# Patient Record
Sex: MALE | Race: WHITE | NOT HISPANIC OR LATINO | Employment: FULL TIME | URBAN - NONMETROPOLITAN AREA
[De-identification: names, ages, dates, MRNs, and addresses within clinical notes are randomized per-mention and may not be internally consistent; named-entity substitution may affect disease eponyms.]

---

## 2023-04-04 ENCOUNTER — TELEPHONE (OUTPATIENT)
Dept: PRIMARY CARE | Facility: CLINIC | Age: 58
End: 2023-04-04
Payer: COMMERCIAL

## 2023-04-04 DIAGNOSIS — F90.9 ATTENTION DEFICIT HYPERACTIVITY DISORDER (ADHD), UNSPECIFIED ADHD TYPE: ICD-10-CM

## 2023-04-04 RX ORDER — DEXTROAMPHETAMINE SACCHARATE, AMPHETAMINE ASPARTATE MONOHYDRATE, DEXTROAMPHETAMINE SULFATE AND AMPHETAMINE SULFATE 2.5; 2.5; 2.5; 2.5 MG/1; MG/1; MG/1; MG/1
1 CAPSULE, EXTENDED RELEASE ORAL DAILY
COMMUNITY
Start: 2023-03-02 | End: 2023-04-04 | Stop reason: SDUPTHER

## 2023-04-04 RX ORDER — DEXTROAMPHETAMINE SACCHARATE, AMPHETAMINE ASPARTATE MONOHYDRATE, DEXTROAMPHETAMINE SULFATE AND AMPHETAMINE SULFATE 2.5; 2.5; 2.5; 2.5 MG/1; MG/1; MG/1; MG/1
10 CAPSULE, EXTENDED RELEASE ORAL DAILY
Qty: 30 CAPSULE | Refills: 0 | Status: SHIPPED | OUTPATIENT
Start: 2023-04-04 | End: 2023-05-01 | Stop reason: SDUPTHER

## 2023-04-28 PROBLEM — G47.00 INSOMNIA: Status: ACTIVE | Noted: 2023-04-28

## 2023-04-28 PROBLEM — F41.9 ANXIETY: Status: ACTIVE | Noted: 2023-04-28

## 2023-04-28 PROBLEM — R53.83 FATIGUE: Status: ACTIVE | Noted: 2023-04-28

## 2023-04-28 PROBLEM — E29.1 HYPOGONADISM MALE: Status: ACTIVE | Noted: 2023-04-28

## 2023-04-28 PROBLEM — M10.072 IDIOPATHIC GOUT INVOLVING TOE OF LEFT FOOT: Status: ACTIVE | Noted: 2023-04-28

## 2023-04-28 PROBLEM — M25.569 KNEE PAIN: Status: RESOLVED | Noted: 2023-04-28 | Resolved: 2023-04-28

## 2023-04-28 RX ORDER — ALLOPURINOL 100 MG/1
100 TABLET ORAL 2 TIMES DAILY
COMMUNITY
End: 2023-07-03

## 2023-04-28 RX ORDER — ZOLPIDEM TARTRATE 10 MG/1
10 TABLET ORAL NIGHTLY PRN
COMMUNITY
End: 2023-06-01 | Stop reason: SDUPTHER

## 2023-04-28 RX ORDER — ESCITALOPRAM OXALATE 20 MG/1
20 TABLET ORAL DAILY
COMMUNITY
End: 2023-07-03 | Stop reason: SDUPTHER

## 2023-05-01 ENCOUNTER — TELEMEDICINE (OUTPATIENT)
Dept: PRIMARY CARE | Facility: CLINIC | Age: 58
End: 2023-05-01
Payer: COMMERCIAL

## 2023-05-01 DIAGNOSIS — F90.9 ATTENTION DEFICIT HYPERACTIVITY DISORDER (ADHD), UNSPECIFIED ADHD TYPE: ICD-10-CM

## 2023-05-01 DIAGNOSIS — F51.01 PRIMARY INSOMNIA: Primary | ICD-10-CM

## 2023-05-01 DIAGNOSIS — F98.8 ATTENTION DEFICIT DISORDER (ADD) WITHOUT HYPERACTIVITY: ICD-10-CM

## 2023-05-01 PROCEDURE — 99212 OFFICE O/P EST SF 10 MIN: CPT | Performed by: FAMILY MEDICINE

## 2023-05-01 RX ORDER — DEXTROAMPHETAMINE SACCHARATE, AMPHETAMINE ASPARTATE MONOHYDRATE, DEXTROAMPHETAMINE SULFATE AND AMPHETAMINE SULFATE 2.5; 2.5; 2.5; 2.5 MG/1; MG/1; MG/1; MG/1
10 CAPSULE, EXTENDED RELEASE ORAL DAILY
Qty: 30 CAPSULE | Refills: 0 | Status: SHIPPED | OUTPATIENT
Start: 2023-05-01 | End: 2023-06-02 | Stop reason: SDUPTHER

## 2023-05-01 NOTE — PROGRESS NOTES
Subjective   Patient ID: Raymond Ruiz is a 57 y.o. male who presents for 3 mo fu virtual.    HPI since the last office visit there have been no interval operations, hospitalizations, important illnesses or injuries.  ADD Adderall working quite well, tried 15 and side effects and back to 10.  70% improvement I nttarget symptoms without  tic or twitching.  No headache stomachache loss of appetite or exacerbation of insomnia  Primary insomnia uses a half of zolpidem nightly with no problems with sleep latency early morning awakening.  Sleep is restorative and no hangover wishes to continue.  Spends 1 out of 4 weeks in [place].    Review of Systems general-no fatigue weight to within 10 pounds  ENT no problems with vision swallowing  Cardiac no chest pains palpitations change in exercise tolerance or capacity  Pulmonary no cough shortness of breath  GI no heartburn or abdominal pain  Musculoskeletal no joint pains    Objective   There were no vitals taken for this visit.    Physical Exam  Video visit  Assessment/Plan   Problem List Items Addressed This Visit          Nervous    Insomnia - Primary    Relevant Orders    Follow Up In Primary Care       Other    Attention deficit disorder (ADD) without hyperactivity    Relevant Orders    Follow Up In Primary Care     We will see face-to-face in 6 months consider lab needs at that time.  [id number] charge for video visit

## 2023-06-01 DIAGNOSIS — F51.01 PRIMARY INSOMNIA: ICD-10-CM

## 2023-06-01 RX ORDER — ZOLPIDEM TARTRATE 10 MG/1
10 TABLET ORAL NIGHTLY PRN
Qty: 30 TABLET | Refills: 0 | Status: SHIPPED | OUTPATIENT
Start: 2023-06-01 | End: 2023-07-31 | Stop reason: SDUPTHER

## 2023-06-02 DIAGNOSIS — F90.9 ATTENTION DEFICIT HYPERACTIVITY DISORDER (ADHD), UNSPECIFIED ADHD TYPE: ICD-10-CM

## 2023-06-05 RX ORDER — DEXTROAMPHETAMINE SACCHARATE, AMPHETAMINE ASPARTATE MONOHYDRATE, DEXTROAMPHETAMINE SULFATE AND AMPHETAMINE SULFATE 2.5; 2.5; 2.5; 2.5 MG/1; MG/1; MG/1; MG/1
10 CAPSULE, EXTENDED RELEASE ORAL DAILY
Qty: 30 CAPSULE | Refills: 0 | Status: SHIPPED | OUTPATIENT
Start: 2023-06-05 | End: 2023-07-03 | Stop reason: SDUPTHER

## 2023-07-03 DIAGNOSIS — F90.9 ATTENTION DEFICIT HYPERACTIVITY DISORDER (ADHD), UNSPECIFIED ADHD TYPE: ICD-10-CM

## 2023-07-03 DIAGNOSIS — M25.569 PAIN IN UNSPECIFIED KNEE: ICD-10-CM

## 2023-07-03 DIAGNOSIS — F41.9 ANXIETY: ICD-10-CM

## 2023-07-03 RX ORDER — ESCITALOPRAM OXALATE 20 MG/1
20 TABLET ORAL DAILY
Qty: 90 TABLET | Refills: 3 | Status: SHIPPED | OUTPATIENT
Start: 2023-07-03 | End: 2023-11-21 | Stop reason: SDUPTHER

## 2023-07-03 RX ORDER — ALLOPURINOL 100 MG/1
TABLET ORAL
Qty: 180 TABLET | Refills: 3 | Status: SHIPPED | OUTPATIENT
Start: 2023-07-03

## 2023-07-03 RX ORDER — DEXTROAMPHETAMINE SACCHARATE, AMPHETAMINE ASPARTATE MONOHYDRATE, DEXTROAMPHETAMINE SULFATE AND AMPHETAMINE SULFATE 2.5; 2.5; 2.5; 2.5 MG/1; MG/1; MG/1; MG/1
10 CAPSULE, EXTENDED RELEASE ORAL DAILY
Qty: 30 CAPSULE | Refills: 0 | Status: SHIPPED | OUTPATIENT
Start: 2023-07-03 | End: 2023-08-02 | Stop reason: SDUPTHER

## 2023-07-31 DIAGNOSIS — F51.01 PRIMARY INSOMNIA: ICD-10-CM

## 2023-07-31 RX ORDER — ZOLPIDEM TARTRATE 10 MG/1
10 TABLET ORAL NIGHTLY PRN
Qty: 30 TABLET | Refills: 0 | Status: SHIPPED | OUTPATIENT
Start: 2023-07-31 | End: 2023-09-25 | Stop reason: SDUPTHER

## 2023-08-02 DIAGNOSIS — F90.9 ATTENTION DEFICIT HYPERACTIVITY DISORDER (ADHD), UNSPECIFIED ADHD TYPE: ICD-10-CM

## 2023-08-02 RX ORDER — DEXTROAMPHETAMINE SACCHARATE, AMPHETAMINE ASPARTATE MONOHYDRATE, DEXTROAMPHETAMINE SULFATE AND AMPHETAMINE SULFATE 2.5; 2.5; 2.5; 2.5 MG/1; MG/1; MG/1; MG/1
10 CAPSULE, EXTENDED RELEASE ORAL DAILY
Qty: 30 CAPSULE | Refills: 0 | Status: SHIPPED | OUTPATIENT
Start: 2023-08-02 | End: 2023-08-31 | Stop reason: SDUPTHER

## 2023-08-31 DIAGNOSIS — F90.9 ATTENTION DEFICIT HYPERACTIVITY DISORDER (ADHD), UNSPECIFIED ADHD TYPE: ICD-10-CM

## 2023-08-31 RX ORDER — DEXTROAMPHETAMINE SACCHARATE, AMPHETAMINE ASPARTATE MONOHYDRATE, DEXTROAMPHETAMINE SULFATE AND AMPHETAMINE SULFATE 2.5; 2.5; 2.5; 2.5 MG/1; MG/1; MG/1; MG/1
10 CAPSULE, EXTENDED RELEASE ORAL DAILY
Qty: 30 CAPSULE | Refills: 0 | Status: SHIPPED | OUTPATIENT
Start: 2023-08-31 | End: 2023-09-29 | Stop reason: SDUPTHER

## 2023-09-25 DIAGNOSIS — F51.01 PRIMARY INSOMNIA: ICD-10-CM

## 2023-09-25 RX ORDER — ZOLPIDEM TARTRATE 10 MG/1
10 TABLET ORAL NIGHTLY PRN
Qty: 30 TABLET | Refills: 0 | Status: SHIPPED | OUTPATIENT
Start: 2023-09-25 | End: 2023-11-21 | Stop reason: SDUPTHER

## 2023-09-29 DIAGNOSIS — F90.9 ATTENTION DEFICIT HYPERACTIVITY DISORDER (ADHD), UNSPECIFIED ADHD TYPE: ICD-10-CM

## 2023-09-29 RX ORDER — DEXTROAMPHETAMINE SACCHARATE, AMPHETAMINE ASPARTATE MONOHYDRATE, DEXTROAMPHETAMINE SULFATE AND AMPHETAMINE SULFATE 2.5; 2.5; 2.5; 2.5 MG/1; MG/1; MG/1; MG/1
10 CAPSULE, EXTENDED RELEASE ORAL DAILY
Qty: 30 CAPSULE | Refills: 0 | Status: SHIPPED | OUTPATIENT
Start: 2023-09-29 | End: 2023-11-21 | Stop reason: WASHOUT

## 2023-11-09 ENCOUNTER — APPOINTMENT (OUTPATIENT)
Dept: PRIMARY CARE | Facility: CLINIC | Age: 58
End: 2023-11-09
Payer: COMMERCIAL

## 2023-11-14 ENCOUNTER — APPOINTMENT (OUTPATIENT)
Dept: PRIMARY CARE | Facility: CLINIC | Age: 58
End: 2023-11-14
Payer: COMMERCIAL

## 2023-11-21 ENCOUNTER — TELEPHONE (OUTPATIENT)
Dept: PRIMARY CARE | Facility: CLINIC | Age: 58
End: 2023-11-21

## 2023-11-21 ENCOUNTER — LAB (OUTPATIENT)
Dept: LAB | Facility: LAB | Age: 58
End: 2023-11-21
Payer: COMMERCIAL

## 2023-11-21 ENCOUNTER — OFFICE VISIT (OUTPATIENT)
Dept: PRIMARY CARE | Facility: CLINIC | Age: 58
End: 2023-11-21
Payer: COMMERCIAL

## 2023-11-21 VITALS
DIASTOLIC BLOOD PRESSURE: 72 MMHG | SYSTOLIC BLOOD PRESSURE: 124 MMHG | WEIGHT: 217 LBS | BODY MASS INDEX: 31.07 KG/M2 | HEART RATE: 92 BPM | HEIGHT: 70 IN | OXYGEN SATURATION: 94 %

## 2023-11-21 DIAGNOSIS — F51.01 PRIMARY INSOMNIA: ICD-10-CM

## 2023-11-21 DIAGNOSIS — F41.9 ANXIETY: ICD-10-CM

## 2023-11-21 DIAGNOSIS — Z12.5 SCREENING FOR PROSTATE CANCER: ICD-10-CM

## 2023-11-21 DIAGNOSIS — M1A.0720 CHRONIC IDIOPATHIC GOUT INVOLVING TOE OF LEFT FOOT WITHOUT TOPHUS: Primary | ICD-10-CM

## 2023-11-21 DIAGNOSIS — F98.8 ATTENTION DEFICIT DISORDER (ADD) WITHOUT HYPERACTIVITY: ICD-10-CM

## 2023-11-21 DIAGNOSIS — M1A.0720 CHRONIC IDIOPATHIC GOUT INVOLVING TOE OF LEFT FOOT WITHOUT TOPHUS: ICD-10-CM

## 2023-11-21 LAB
ALBUMIN SERPL BCP-MCNC: 4.4 G/DL (ref 3.4–5)
ALP SERPL-CCNC: 70 U/L (ref 33–120)
ALT SERPL W P-5'-P-CCNC: 39 U/L (ref 10–52)
ANION GAP SERPL CALC-SCNC: 10 MMOL/L (ref 10–20)
AST SERPL W P-5'-P-CCNC: 22 U/L (ref 9–39)
BILIRUB SERPL-MCNC: 0.8 MG/DL (ref 0–1.2)
BUN SERPL-MCNC: 16 MG/DL (ref 6–23)
CALCIUM SERPL-MCNC: 9.3 MG/DL (ref 8.6–10.3)
CHLORIDE SERPL-SCNC: 105 MMOL/L (ref 98–107)
CHOLEST SERPL-MCNC: 213 MG/DL (ref 0–199)
CHOLESTEROL/HDL RATIO: 3.8
CO2 SERPL-SCNC: 29 MMOL/L (ref 21–32)
CREAT SERPL-MCNC: 0.94 MG/DL (ref 0.5–1.3)
ERYTHROCYTE [DISTWIDTH] IN BLOOD BY AUTOMATED COUNT: 13.2 % (ref 11.5–14.5)
GFR SERPL CREATININE-BSD FRML MDRD: >90 ML/MIN/1.73M*2
GLUCOSE SERPL-MCNC: 108 MG/DL (ref 74–99)
HCT VFR BLD AUTO: 47 % (ref 41–52)
HDLC SERPL-MCNC: 56 MG/DL
HGB BLD-MCNC: 15.6 G/DL (ref 13.5–17.5)
LDLC SERPL CALC-MCNC: 139 MG/DL
MCH RBC QN AUTO: 31.2 PG (ref 26–34)
MCHC RBC AUTO-ENTMCNC: 33.2 G/DL (ref 32–36)
MCV RBC AUTO: 94 FL (ref 80–100)
NON HDL CHOLESTEROL: 157 MG/DL (ref 0–149)
NRBC BLD-RTO: 0 /100 WBCS (ref 0–0)
PLATELET # BLD AUTO: 165 X10*3/UL (ref 150–450)
POTASSIUM SERPL-SCNC: 4.2 MMOL/L (ref 3.5–5.3)
PROT SERPL-MCNC: 6.6 G/DL (ref 6.4–8.2)
PSA SERPL-MCNC: 0.62 NG/ML
RBC # BLD AUTO: 5 X10*6/UL (ref 4.5–5.9)
SODIUM SERPL-SCNC: 140 MMOL/L (ref 136–145)
TRIGL SERPL-MCNC: 89 MG/DL (ref 0–149)
VLDL: 18 MG/DL (ref 0–40)
WBC # BLD AUTO: 8.2 X10*3/UL (ref 4.4–11.3)

## 2023-11-21 PROCEDURE — 36415 COLL VENOUS BLD VENIPUNCTURE: CPT

## 2023-11-21 PROCEDURE — 99214 OFFICE O/P EST MOD 30 MIN: CPT | Performed by: FAMILY MEDICINE

## 2023-11-21 PROCEDURE — 1036F TOBACCO NON-USER: CPT | Performed by: FAMILY MEDICINE

## 2023-11-21 PROCEDURE — 85027 COMPLETE CBC AUTOMATED: CPT

## 2023-11-21 PROCEDURE — 80061 LIPID PANEL: CPT

## 2023-11-21 PROCEDURE — 84153 ASSAY OF PSA TOTAL: CPT

## 2023-11-21 PROCEDURE — 80053 COMPREHEN METABOLIC PANEL: CPT

## 2023-11-21 RX ORDER — ESCITALOPRAM OXALATE 20 MG/1
20 TABLET ORAL DAILY
Qty: 90 TABLET | Refills: 3 | Status: SHIPPED | OUTPATIENT
Start: 2023-11-21 | End: 2024-11-20

## 2023-11-21 RX ORDER — ZOLPIDEM TARTRATE 10 MG/1
10 TABLET ORAL NIGHTLY PRN
Qty: 30 TABLET | Refills: 0 | Status: SHIPPED | OUTPATIENT
Start: 2023-11-21 | End: 2024-01-17 | Stop reason: SDUPTHER

## 2023-11-21 RX ORDER — ATOMOXETINE 40 MG/1
40 CAPSULE ORAL DAILY
Qty: 30 CAPSULE | Refills: 3 | Status: SHIPPED | OUTPATIENT
Start: 2023-11-21 | End: 2024-01-22 | Stop reason: WASHOUT

## 2023-11-21 RX ORDER — ATOMOXETINE 25 MG/1
25 CAPSULE ORAL DAILY
Qty: 14 CAPSULE | Refills: 0 | Status: SHIPPED | OUTPATIENT
Start: 2023-11-21 | End: 2024-01-22 | Stop reason: WASHOUT

## 2023-11-21 NOTE — TELEPHONE ENCOUNTER
----- Message from Ruslan Cuello MD sent at 11/21/2023  1:03 PM EST -----  Please let patient know all the labs done today is good.

## 2023-11-21 NOTE — PROGRESS NOTES
"Subjective   Patient ID: Raymond Ruiz is a 58 y.o. male who presents for Follow-up (6 mo).    HPI   Stopped adderalll 2 t constipation.  Noted target symptoms of focus and impulsivity returned.  Family member on Strattera.  He would use prescription provided    Insomnia 30 minutes or less sleep latency no early morning awakening no hangover sleep is restorative historically was taking half tab when he was out of town last month he was out of town for 1 month and generally takes half a tab daily. I have personally reviewed the OARRS report for the above patient.  This report is scanned into the electronic medical record.  I have considered the risks of abuse, dependence, addiction, and diversion.  I believe that it is clinically appropriate for the above patient to be prescribed this medication.  Anxiety is managed on Lexapro, felt less anxiety when on Adderall.  Gout- no interval exacerbations, continues on prophylactic therapy without side effects.      Review of Systems  General-no fatigue weight to within 10 pounds  ENT no problems with vision swallowing  Cardiac no chest pains palpitations change in exercise tolerance or capacity  Pulmonary no cough shortness of breath  GI no heartburn or abdominal pain  Musculoskeletal no joint pains  Objective   /72   Pulse 92   Ht 1.778 m (5' 10\")   Wt 98.4 kg (217 lb)   SpO2 94%   BMI 31.14 kg/m²     Physical Exam  General:  Alert, No acute distress. Appears stated age  Eye:  Pupils are equal, round and reactive to light, Extraocular movements are intact, Normal conjunctiva.    Neck:  Supple, Non-tender, No carotid bruit, No jugular venous distention, No lymphadenopathy, No thyromegaly.    Respiratory:  Lungs are clear to auscultation, Respirations are non-labored, Breath sounds are equal.    Cardiovascular:  Normal rate, Regular rhythm, No murmur.    Gastrointestinal:  Soft, Non-tender, No organomegaly. No solid or pulsatile mass  Integumentary:  Warm, Dry. " No concerning lesions on exposed areas  Neurologic:  Alert, Oriented.  Gross and fine motor intact, CN 2-12 intact  Psychiatric:  Cooperative, Appropriate mood & affect.  Assessment/Plan   Problem List Items Addressed This Visit             ICD-10-CM    Anxiety F41.9    Relevant Medications    escitalopram (Lexapro) 20 mg tablet    zolpidem (Ambien) 10 mg tablet    Other Relevant Orders    CBC (Completed)    Comprehensive Metabolic Panel (Completed)    Lipid Panel (Completed)    Follow Up In Primary Care - Established    Idiopathic gout involving toe of left foot - Primary M10.072    Relevant Orders    CBC (Completed)    Comprehensive Metabolic Panel (Completed)    Follow Up In Primary Care - Established    Insomnia G47.00    Relevant Medications    zolpidem (Ambien) 10 mg tablet    Other Relevant Orders    CBC (Completed)    Comprehensive Metabolic Panel (Completed)    Lipid Panel (Completed)    Follow Up In Primary Care - Established    Attention deficit disorder (ADD) without hyperactivity F98.8    Relevant Medications    atomoxetine (Strattera) 25 mg capsule    atomoxetine (Strattera) 40 mg capsule    Other Relevant Orders    Follow Up In Primary Care - Established     Other Visit Diagnoses         Codes    Screening for prostate cancer     Z12.5    Relevant Orders    Prostate Specific Antigen, Screen (Completed)    Follow Up In Primary Care - Established

## 2023-12-04 ENCOUNTER — TELEPHONE (OUTPATIENT)
Dept: PRIMARY CARE | Facility: CLINIC | Age: 58
End: 2023-12-04
Payer: COMMERCIAL

## 2023-12-05 DIAGNOSIS — K64.9 HEMORRHOIDS, UNSPECIFIED HEMORRHOID TYPE: ICD-10-CM

## 2023-12-14 ENCOUNTER — TELEPHONE (OUTPATIENT)
Dept: PRIMARY CARE | Facility: CLINIC | Age: 58
End: 2023-12-14
Payer: COMMERCIAL

## 2023-12-14 NOTE — TELEPHONE ENCOUNTER
PATIENT CANNOT SEE DR RUSSO UNTIL AFTER JANUARY. IS IN EXCRUCIATING PAIN. HAS USED CREAM, SUPPOSITORIES WITHOUT HELP. PLEASE ADVISE.

## 2023-12-15 ENCOUNTER — TELEPHONE (OUTPATIENT)
Dept: PRIMARY CARE | Facility: CLINIC | Age: 58
End: 2023-12-15
Payer: COMMERCIAL

## 2023-12-15 DIAGNOSIS — K64.9 HEMORRHOIDS, UNSPECIFIED HEMORRHOID TYPE: ICD-10-CM

## 2023-12-15 NOTE — TELEPHONE ENCOUNTER
[facility] first available, or call [facility]     You routed conversation to Ruslan Cuello MD22 hours ago (3:50 PM)     You22 hours ago (3:50 PM)       PATIENT CANNOT SEE DR RUSSO UNTIL AFTER JANUARY. IS IN EXCRUCIATING PAIN. HAS USED CREAM, SUPPOSITORIES WITHOUT HELP. PLEASE ADVISE.      Unsigned     PATIENT NOTIFIED. AGREEABLE TO SEE [facility] AT [facility]. ORDER, RECORDS, DEMOGRAPHICS, AND INSURANCE FAXED

## 2024-01-04 ENCOUNTER — APPOINTMENT (OUTPATIENT)
Dept: SURGERY | Facility: CLINIC | Age: 59
End: 2024-01-04
Payer: COMMERCIAL

## 2024-01-17 DIAGNOSIS — F41.9 ANXIETY: ICD-10-CM

## 2024-01-17 DIAGNOSIS — F51.01 PRIMARY INSOMNIA: ICD-10-CM

## 2024-01-17 RX ORDER — ZOLPIDEM TARTRATE 10 MG/1
10 TABLET ORAL NIGHTLY PRN
Qty: 30 TABLET | Refills: 0 | Status: SHIPPED | OUTPATIENT
Start: 2024-01-17 | End: 2024-03-11 | Stop reason: SDUPTHER

## 2024-01-22 ENCOUNTER — TELEPHONE (OUTPATIENT)
Dept: PRIMARY CARE | Facility: CLINIC | Age: 59
End: 2024-01-22
Payer: COMMERCIAL

## 2024-01-22 DIAGNOSIS — F98.8 ATTENTION DEFICIT DISORDER (ADD) WITHOUT HYPERACTIVITY: Primary | ICD-10-CM

## 2024-01-22 RX ORDER — DEXTROAMPHETAMINE SACCHARATE, AMPHETAMINE ASPARTATE MONOHYDRATE, DEXTROAMPHETAMINE SULFATE AND AMPHETAMINE SULFATE 3.75; 3.75; 3.75; 3.75 MG/1; MG/1; MG/1; MG/1
15 CAPSULE, EXTENDED RELEASE ORAL DAILY
COMMUNITY
Start: 2023-02-04 | End: 2024-01-22 | Stop reason: SDUPTHER

## 2024-01-22 RX ORDER — DEXTROAMPHETAMINE SACCHARATE, AMPHETAMINE ASPARTATE MONOHYDRATE, DEXTROAMPHETAMINE SULFATE AND AMPHETAMINE SULFATE 2.5; 2.5; 2.5; 2.5 MG/1; MG/1; MG/1; MG/1
10 CAPSULE, EXTENDED RELEASE ORAL DAILY
Qty: 30 CAPSULE | Refills: 0 | Status: SHIPPED | OUTPATIENT
Start: 2024-01-22 | End: 2024-05-22 | Stop reason: SDUPTHER

## 2024-01-23 ENCOUNTER — CLINICAL SUPPORT (OUTPATIENT)
Dept: PRIMARY CARE | Facility: CLINIC | Age: 59
End: 2024-01-23
Payer: COMMERCIAL

## 2024-01-23 DIAGNOSIS — F98.8 ATTENTION DEFICIT DISORDER (ADD) WITHOUT HYPERACTIVITY: ICD-10-CM

## 2024-01-23 LAB
AMPHETAMINES UR QL SCN: NORMAL
BARBITURATES UR QL SCN: NORMAL
BENZODIAZ UR QL SCN: NORMAL
BZE UR QL SCN: NORMAL
CANNABINOIDS UR QL SCN: NORMAL
FENTANYL+NORFENTANYL UR QL SCN: NORMAL
OPIATES UR QL SCN: NORMAL
OXYCODONE+OXYMORPHONE UR QL SCN: NORMAL
PCP UR QL SCN: NORMAL

## 2024-01-23 PROCEDURE — 80307 DRUG TEST PRSMV CHEM ANLYZR: CPT

## 2024-03-11 DIAGNOSIS — F51.01 PRIMARY INSOMNIA: ICD-10-CM

## 2024-03-11 DIAGNOSIS — F41.9 ANXIETY: ICD-10-CM

## 2024-03-11 RX ORDER — ZOLPIDEM TARTRATE 10 MG/1
10 TABLET ORAL NIGHTLY PRN
Qty: 30 TABLET | Refills: 0 | Status: SHIPPED | OUTPATIENT
Start: 2024-03-11

## 2024-05-22 ENCOUNTER — OFFICE VISIT (OUTPATIENT)
Dept: PRIMARY CARE | Facility: CLINIC | Age: 59
End: 2024-05-22
Payer: COMMERCIAL

## 2024-05-22 VITALS
WEIGHT: 220.2 LBS | BODY MASS INDEX: 31.52 KG/M2 | HEART RATE: 87 BPM | DIASTOLIC BLOOD PRESSURE: 80 MMHG | SYSTOLIC BLOOD PRESSURE: 124 MMHG | OXYGEN SATURATION: 96 % | HEIGHT: 70 IN

## 2024-05-22 DIAGNOSIS — F98.8 ATTENTION DEFICIT DISORDER (ADD) WITHOUT HYPERACTIVITY: ICD-10-CM

## 2024-05-22 DIAGNOSIS — F51.01 PRIMARY INSOMNIA: Primary | ICD-10-CM

## 2024-05-22 DIAGNOSIS — Z12.5 SCREENING FOR PROSTATE CANCER: ICD-10-CM

## 2024-05-22 DIAGNOSIS — E78.2 MIXED HYPERLIPIDEMIA: ICD-10-CM

## 2024-05-22 DIAGNOSIS — M1A.0720 CHRONIC IDIOPATHIC GOUT INVOLVING TOE OF LEFT FOOT WITHOUT TOPHUS: ICD-10-CM

## 2024-05-22 DIAGNOSIS — F41.9 ANXIETY: ICD-10-CM

## 2024-05-22 PROCEDURE — 1036F TOBACCO NON-USER: CPT | Performed by: FAMILY MEDICINE

## 2024-05-22 PROCEDURE — 99214 OFFICE O/P EST MOD 30 MIN: CPT | Performed by: FAMILY MEDICINE

## 2024-05-22 RX ORDER — DEXTROAMPHETAMINE SACCHARATE, AMPHETAMINE ASPARTATE MONOHYDRATE, DEXTROAMPHETAMINE SULFATE AND AMPHETAMINE SULFATE 1.25; 1.25; 1.25; 1.25 MG/1; MG/1; MG/1; MG/1
5 CAPSULE, EXTENDED RELEASE ORAL DAILY
Qty: 30 CAPSULE | Refills: 0 | Status: SHIPPED | OUTPATIENT
Start: 2024-05-22 | End: 2024-06-21

## 2024-05-22 NOTE — PROGRESS NOTES
"Subjective   Patient ID: Raymond Ruiz is a 58 y.o. male who presents for Follow-up (6 mo).    HPI   Had anal fissure and has had botox.  Had constipation(first time), docusate vs titrated miralax  Feels constipation from adderall and will titrate down if able trial of 5 mg  I have personally reviewed the OARRS report for the above patient.  This report is scanned into the electronic medical record.  I have considered the risks of abuse, dependence, addiction, and diversion.  I believe that it is clinically appropriate for the above patient to be prescribed this medication.    Insomnia less ambien use woth good sleep pattern, travelling less  Lexapro reduces anxiety, tried titration, back to whole  Gout- no interval exacerbations, continues on prophylactic therapy without side effects.  On allop 100  Review of Systems  General-no fatigue weight to within 10 pounds  ENT no problems with vision swallowing  Cardiac no chest pains palpitations change in exercise tolerance or capacity  Pulmonary no cough shortness of breath  GI no heartburn or abdominal pain  Musculoskeletal no joint pains    Objective   /80   Pulse 87   Ht 1.778 m (5' 10\")   Wt 99.9 kg (220 lb 3.2 oz)   SpO2 96%   BMI 31.60 kg/m²     Physical Exam  General:  Alert, No acute distress. Appears stated age  Eye:  Pupils are equal, round and reactive to light, Extraocular movements are intact, Normal conjunctiva.    Neck:  Supple, Non-tender, No carotid bruit, No jugular venous distention, No lymphadenopathy, No thyromegaly.    Respiratory:  Lungs are clear to auscultation, Respirations are non-labored, Breath sounds are equal.    Cardiovascular:  Normal rate, Regular rhythm, No murmur.    Gastrointestinal:  Soft, Non-tender, No organomegaly. No solid or pulsatile mass  Integumentary:  Warm, Dry. No concerning lesions on exposed areas  Neurologic:  Alert, Oriented.  Gross and fine motor intact, CN 2-12 intact  Psychiatric:  Cooperative, " Appropriate mood & affect.    Assessment/Plan   Problem List Items Addressed This Visit             ICD-10-CM    Anxiety F41.9    Relevant Orders    Follow Up In Primary Care - Established    Idiopathic gout involving toe of left foot M10.072    Relevant Orders    CBC    Comprehensive Metabolic Panel    Uric Acid    Follow Up In Primary Care - Established    Insomnia - Primary G47.00    Relevant Orders    Follow Up In Primary Care - Established    Attention deficit disorder (ADD) without hyperactivity F98.8    Relevant Medications    amphetamine-dextroamphetamine XR (Adderall XR) 5 mg 24 hr capsule    Other Relevant Orders    Follow Up In Primary Care - Established     Other Visit Diagnoses         Codes    Screening for prostate cancer     Z12.5    Relevant Orders    Prostate Specific Antigen, Screen    Follow Up In Primary Care - Established    Mixed hyperlipidemia     E78.2    Relevant Orders    Lipid Panel    Follow Up In Primary Care - Established

## 2024-07-10 DIAGNOSIS — F41.9 ANXIETY: ICD-10-CM

## 2024-07-10 DIAGNOSIS — F51.01 PRIMARY INSOMNIA: ICD-10-CM

## 2024-07-10 RX ORDER — ZOLPIDEM TARTRATE 10 MG/1
10 TABLET ORAL NIGHTLY PRN
Qty: 30 TABLET | Refills: 0 | Status: SHIPPED | OUTPATIENT
Start: 2024-07-10

## 2024-07-11 DIAGNOSIS — M25.569 PAIN IN UNSPECIFIED KNEE: ICD-10-CM

## 2024-07-11 RX ORDER — ALLOPURINOL 100 MG/1
100 TABLET ORAL 2 TIMES DAILY
Qty: 180 TABLET | Refills: 3 | Status: SHIPPED | OUTPATIENT
Start: 2024-07-11

## 2024-10-03 DIAGNOSIS — F51.01 PRIMARY INSOMNIA: ICD-10-CM

## 2024-10-03 DIAGNOSIS — F41.9 ANXIETY: ICD-10-CM

## 2024-10-03 RX ORDER — ZOLPIDEM TARTRATE 10 MG/1
10 TABLET ORAL NIGHTLY PRN
Qty: 30 TABLET | Refills: 0 | Status: SHIPPED | OUTPATIENT
Start: 2024-10-03

## 2024-11-27 ENCOUNTER — APPOINTMENT (OUTPATIENT)
Dept: PRIMARY CARE | Facility: CLINIC | Age: 59
End: 2024-11-27
Payer: COMMERCIAL

## 2024-11-27 VITALS
WEIGHT: 221 LBS | DIASTOLIC BLOOD PRESSURE: 90 MMHG | HEIGHT: 70 IN | BODY MASS INDEX: 31.64 KG/M2 | SYSTOLIC BLOOD PRESSURE: 132 MMHG | HEART RATE: 76 BPM | OXYGEN SATURATION: 97 %

## 2024-11-27 DIAGNOSIS — F98.8 ATTENTION DEFICIT DISORDER (ADD) WITHOUT HYPERACTIVITY: ICD-10-CM

## 2024-11-27 DIAGNOSIS — E78.2 MIXED HYPERLIPIDEMIA: ICD-10-CM

## 2024-11-27 DIAGNOSIS — Z12.5 SCREENING FOR PROSTATE CANCER: ICD-10-CM

## 2024-11-27 DIAGNOSIS — F41.9 ANXIETY: ICD-10-CM

## 2024-11-27 DIAGNOSIS — M1A.0720 CHRONIC IDIOPATHIC GOUT INVOLVING TOE OF LEFT FOOT WITHOUT TOPHUS: ICD-10-CM

## 2024-11-27 DIAGNOSIS — F51.01 PRIMARY INSOMNIA: ICD-10-CM

## 2024-11-27 PROCEDURE — 99214 OFFICE O/P EST MOD 30 MIN: CPT | Performed by: FAMILY MEDICINE

## 2024-11-27 PROCEDURE — 3008F BODY MASS INDEX DOCD: CPT | Performed by: FAMILY MEDICINE

## 2024-11-27 PROCEDURE — 1036F TOBACCO NON-USER: CPT | Performed by: FAMILY MEDICINE

## 2024-11-27 RX ORDER — ESCITALOPRAM OXALATE 20 MG/1
20 TABLET ORAL DAILY
Qty: 90 TABLET | Refills: 3 | Status: SHIPPED | OUTPATIENT
Start: 2024-11-27 | End: 2025-11-27

## 2024-11-27 RX ORDER — ZOLPIDEM TARTRATE 10 MG/1
10 TABLET ORAL NIGHTLY PRN
Qty: 30 TABLET | Refills: 0 | Status: SHIPPED | OUTPATIENT
Start: 2024-11-27

## 2024-11-27 NOTE — PROGRESS NOTES
"Subjective   Patient ID: Raymond Ruiz is a 59 y.o. male who presents for Follow-up (6 mo).    HPI   Anal fissure, not completely healed.  Had 2 botox injections, still dailyapin at 4, was 9-10.  Gout- no interval exacerbations, continues on prophylactic therapy without side effects.  ADD- concidering resuming adderall, may resume and titrate up to 20.HTN-Takes and tolerates meds without side effects. No alcohol. no tobacco. no exercise. low salt.  Reviewed recommendation for 150 minutes of exercise per week including 2 days of weight training if over age 50 ad cncerns re constipation affecting fissure  Anxiety- tapered and reproved indication  Insomnia- typically half, varies but less than 15/30  Review of Systems  General-no fatigue weight to within 10 pounds  ENT no problems with vision swallowing  Cardiac no chest pains palpitations change in exercise tolerance or capacity  Pulmonary no cough shortness of breath  GI no heartburn or abdominal pain  Musculoskeletal no joint pains    Objective   /90   Pulse 76   Ht 1.778 m (5' 10\")   Wt 100 kg (221 lb)   SpO2 97%   BMI 31.71 kg/m²     Physical Exam  General:  Alert, No acute distress. Appears stated age  Eye:  Pupils are equal, round and reactive to light, Extraocular movements are intact, Normal conjunctiva.    Neck:  Supple, Non-tender, No carotid bruit, No jugular venous distention, No lymphadenopathy, No thyromegaly.    Respiratory:  Lungs are clear to auscultation, Respirations are non-labored, Breath sounds are equal.    Cardiovascular:  Normal rate, Regular rhythm, No murmur.    Gastrointestinal:  Soft, Non-tender, No organomegaly. No solid or pulsatile mass  Integumentary:  Warm, Dry. No concerning lesions on exposed areas  Neurologic:  Alert, Oriented.  Gross and fine motor intact, CN 2-12 intact  Psychiatric:  Cooperative, Appropriate mood & affect.    Assessment/Plan   Problem List Items Addressed This Visit             ICD-10-CM    " Anxiety F41.9    Relevant Medications    escitalopram (Lexapro) 20 mg tablet    zolpidem (Ambien) 10 mg tablet    Other Relevant Orders    Follow Up In Primary Care    Idiopathic gout involving toe of left foot M10.072    Relevant Orders    CBC    Comprehensive Metabolic Panel    Follow Up In Primary Care    Insomnia G47.00    Relevant Medications    zolpidem (Ambien) 10 mg tablet    Other Relevant Orders    Follow Up In Primary Care    Attention deficit disorder (ADD) without hyperactivity F98.8    Relevant Orders    Follow Up In Primary Care     Other Visit Diagnoses         Codes    Screening for prostate cancer     Z12.5    Relevant Orders    CBC    Comprehensive Metabolic Panel    Prostate Specific Antigen, Screen    Follow Up In Primary Care    Mixed hyperlipidemia     E78.2    Relevant Orders    CBC    Comprehensive Metabolic Panel    Lipid Panel    Follow Up In Primary Care

## 2024-12-09 DIAGNOSIS — F41.9 ANXIETY: ICD-10-CM

## 2024-12-09 RX ORDER — ESCITALOPRAM OXALATE 20 MG/1
20 TABLET ORAL DAILY
Qty: 90 TABLET | Refills: 3 | Status: SHIPPED | OUTPATIENT
Start: 2024-12-09 | End: 2025-12-09

## 2025-03-24 DIAGNOSIS — F51.01 PRIMARY INSOMNIA: ICD-10-CM

## 2025-03-24 DIAGNOSIS — F41.9 ANXIETY: ICD-10-CM

## 2025-03-24 RX ORDER — ZOLPIDEM TARTRATE 10 MG/1
10 TABLET ORAL NIGHTLY PRN
Qty: 30 TABLET | Refills: 0 | Status: SHIPPED | OUTPATIENT
Start: 2025-03-24

## 2025-05-23 DIAGNOSIS — F41.9 ANXIETY: ICD-10-CM

## 2025-05-23 DIAGNOSIS — F51.01 PRIMARY INSOMNIA: ICD-10-CM

## 2025-05-27 RX ORDER — ZOLPIDEM TARTRATE 10 MG/1
10 TABLET ORAL NIGHTLY PRN
Qty: 30 TABLET | Refills: 0 | Status: SHIPPED | OUTPATIENT
Start: 2025-05-27

## 2025-06-03 ENCOUNTER — APPOINTMENT (OUTPATIENT)
Age: 60
End: 2025-06-03
Payer: COMMERCIAL

## 2025-06-10 ENCOUNTER — APPOINTMENT (OUTPATIENT)
Age: 60
End: 2025-06-10
Payer: COMMERCIAL

## 2025-06-10 VITALS
BODY MASS INDEX: 32.14 KG/M2 | DIASTOLIC BLOOD PRESSURE: 70 MMHG | WEIGHT: 224 LBS | HEART RATE: 84 BPM | OXYGEN SATURATION: 94 % | SYSTOLIC BLOOD PRESSURE: 118 MMHG

## 2025-06-10 DIAGNOSIS — F41.9 ANXIETY: ICD-10-CM

## 2025-06-10 DIAGNOSIS — Z12.5 SCREENING FOR PROSTATE CANCER: ICD-10-CM

## 2025-06-10 DIAGNOSIS — M1A.0720 CHRONIC IDIOPATHIC GOUT INVOLVING TOE OF LEFT FOOT WITHOUT TOPHUS: ICD-10-CM

## 2025-06-10 DIAGNOSIS — E29.1 HYPOGONADISM MALE: ICD-10-CM

## 2025-06-10 DIAGNOSIS — E78.2 MIXED HYPERLIPIDEMIA: ICD-10-CM

## 2025-06-10 DIAGNOSIS — F51.01 PRIMARY INSOMNIA: Primary | ICD-10-CM

## 2025-06-10 PROCEDURE — 1036F TOBACCO NON-USER: CPT | Performed by: FAMILY MEDICINE

## 2025-06-10 PROCEDURE — 99213 OFFICE O/P EST LOW 20 MIN: CPT | Performed by: FAMILY MEDICINE

## 2025-06-10 NOTE — PROGRESS NOTES
Subjective   Patient ID: Raymond Ruiz is a 59 y.o. male who presents for Follow-up (6 mo).    HPI   Since the last office visit there have been no interval operations, hospitalizations, important illnesses or injuries.  Father dec 2 weeks ago cva  Gout- no interval exacerbations, continues on prophylactic therapy without side effects.  Lexapro at 10 mg for anxiety dep-reduced and target sx controlled  No adderall  Zolpidem- prn half tab if trouble wothSL at travel    Testosterone topical from [employer]. Less fatighue at end of day  Review of Systems  General-no fatigue weight to within 10 pounds  ENT no problems with vision swallowing  Cardiac no chest pains palpitations change in exercise tolerance or capacity  Pulmonary no cough shortness of breath  GI no heartburn or abdominal pain  Musculoskeletal no joint pains  Objective   /70   Pulse 84   Wt 102 kg (224 lb)   SpO2 94%   BMI 32.14 kg/m²     Physical Exam  Regular without murmur lungs clear to auscultation abdomen soft no soft or pulsatile masses  Assessment/Plan   Problem List Items Addressed This Visit           ICD-10-CM    Anxiety F41.9    Relevant Orders    Follow Up In Primary Care    Hypogonadism male E29.1    Relevant Orders    CBC    PSA    Testosterone    Idiopathic gout involving toe of left foot M10.072    Relevant Orders    Follow Up In Primary Care    Insomnia - Primary G47.00    Relevant Orders    DRUG SCREEN,URINE    Follow Up In Primary Care     Other Visit Diagnoses         Codes      Screening for prostate cancer     Z12.5      Mixed hyperlipidemia     E78.2    Relevant Orders    Follow Up In Primary Care

## 2025-06-11 LAB
AMPHETAMINES UR QL: NEGATIVE NG/ML
BARBITURATES UR QL: NEGATIVE NG/ML
BENZODIAZ UR QL: NEGATIVE NG/ML
BZE UR QL: NEGATIVE NG/ML
CREAT UR-MCNC: 202.5 MG/DL
ERYTHROCYTE [DISTWIDTH] IN BLOOD BY AUTOMATED COUNT: 13.2 % (ref 11–15)
FENTANYL UR QL SCN: NEGATIVE NG/ML
HCT VFR BLD AUTO: 48.8 % (ref 38.5–50)
HGB BLD-MCNC: 16.2 G/DL (ref 13.2–17.1)
MCH RBC QN AUTO: 31.5 PG (ref 27–33)
MCHC RBC AUTO-ENTMCNC: 33.2 G/DL (ref 32–36)
MCV RBC AUTO: 94.9 FL (ref 80–100)
METHADONE UR QL: NEGATIVE NG/ML
OPIATES UR QL: NEGATIVE NG/ML
OXIDANTS UR QL: NEGATIVE MCG/ML
OXYCODONE UR QL: NEGATIVE NG/ML
PH UR: 5 [PH] (ref 4.5–9)
PLATELET # BLD AUTO: 160 THOUSAND/UL (ref 140–400)
PMV BLD REES-ECKER: 9.5 FL (ref 7.5–12.5)
PSA SERPL-MCNC: 0.57 NG/ML
QUEST NOTES AND COMMENTS: NORMAL
RBC # BLD AUTO: 5.14 MILLION/UL (ref 4.2–5.8)
TESTOST SERPL-MCNC: 474 NG/DL (ref 250–827)
THC UR QL: NEGATIVE NG/ML
WBC # BLD AUTO: 6.8 THOUSAND/UL (ref 3.8–10.8)

## 2025-06-12 ENCOUNTER — TELEPHONE (OUTPATIENT)
Age: 60
End: 2025-06-12
Payer: COMMERCIAL

## 2025-06-12 NOTE — TELEPHONE ENCOUNTER
----- Message from Ruslan Cuello sent at 6/11/2025  1:17 PM EDT -----  Please let patient know all labs are acceptable.  ----- Message -----  From: Gaye Agendia Results In  Sent: 6/11/2025   5:11 AM EDT  To: Ruslan Cuello MD    LMFRAN w/ results. Pt to call with any questions.

## 2025-12-10 ENCOUNTER — APPOINTMENT (OUTPATIENT)
Age: 60
End: 2025-12-10
Payer: COMMERCIAL